# Patient Record
Sex: MALE | Race: WHITE | HISPANIC OR LATINO | Employment: UNEMPLOYED | URBAN - METROPOLITAN AREA
[De-identification: names, ages, dates, MRNs, and addresses within clinical notes are randomized per-mention and may not be internally consistent; named-entity substitution may affect disease eponyms.]

---

## 2021-12-17 ENCOUNTER — APPOINTMENT (EMERGENCY)
Dept: RADIOLOGY | Facility: HOSPITAL | Age: 36
DRG: 383 | End: 2021-12-17
Payer: COMMERCIAL

## 2021-12-17 ENCOUNTER — HOSPITAL ENCOUNTER (EMERGENCY)
Facility: HOSPITAL | Age: 36
DRG: 383 | End: 2021-12-17
Attending: EMERGENCY MEDICINE | Admitting: EMERGENCY MEDICINE
Payer: COMMERCIAL

## 2021-12-17 ENCOUNTER — HOSPITAL ENCOUNTER (INPATIENT)
Facility: HOSPITAL | Age: 36
LOS: 2 days | Discharge: HOME | DRG: 383 | End: 2021-12-19
Attending: ORTHOPAEDIC SURGERY | Admitting: ORTHOPAEDIC SURGERY
Payer: COMMERCIAL

## 2021-12-17 VITALS
OXYGEN SATURATION: 97 % | DIASTOLIC BLOOD PRESSURE: 69 MMHG | TEMPERATURE: 99.5 F | WEIGHT: 265 LBS | SYSTOLIC BLOOD PRESSURE: 114 MMHG | HEART RATE: 100 BPM | HEIGHT: 68 IN | BODY MASS INDEX: 40.16 KG/M2 | RESPIRATION RATE: 16 BRPM

## 2021-12-17 DIAGNOSIS — M65.141 SUPPURATIVE TENOSYNOVITIS OF FLEXOR TENDON OF RIGHT HAND: Primary | ICD-10-CM

## 2021-12-17 DIAGNOSIS — L03.011 CELLULITIS OF MIDDLE FINGER, RIGHT: Primary | ICD-10-CM

## 2021-12-17 LAB
ANION GAP SERPL CALCULATED.3IONS-SCNC: 10 MMOL/L (ref 4–13)
BASOPHILS # BLD AUTO: 0.02 THOUSANDS/ΜL (ref 0–0.1)
BASOPHILS NFR BLD AUTO: 0 % (ref 0–1)
BUN SERPL-MCNC: 15 MG/DL (ref 5–25)
CALCIUM SERPL-MCNC: 9.2 MG/DL (ref 8.3–10.1)
CHLORIDE SERPL-SCNC: 103 MMOL/L (ref 100–108)
CO2 SERPL-SCNC: 26 MMOL/L (ref 21–32)
CREAT SERPL-MCNC: 1.28 MG/DL (ref 0.6–1.3)
EOSINOPHIL # BLD AUTO: 0.11 THOUSAND/ΜL (ref 0–0.61)
EOSINOPHIL NFR BLD AUTO: 1 % (ref 0–6)
ERYTHROCYTE [DISTWIDTH] IN BLOOD BY AUTOMATED COUNT: 11 % (ref 11.6–15.1)
GFR SERPL CREATININE-BSD FRML MDRD: 72 ML/MIN/1.73SQ M
GLUCOSE SERPL-MCNC: 112 MG/DL (ref 65–140)
HCT VFR BLD AUTO: 42 % (ref 36.5–49.3)
HGB BLD-MCNC: 14.4 G/DL (ref 12–17)
IMM GRANULOCYTES # BLD AUTO: 0.03 THOUSAND/UL (ref 0–0.2)
IMM GRANULOCYTES NFR BLD AUTO: 0 % (ref 0–2)
LACTATE SERPL-SCNC: 1.1 MMOL/L (ref 0.5–2)
LYMPHOCYTES # BLD AUTO: 2.56 THOUSANDS/ΜL (ref 0.6–4.47)
LYMPHOCYTES NFR BLD AUTO: 25 % (ref 14–44)
MCH RBC QN AUTO: 31.4 PG (ref 26.8–34.3)
MCHC RBC AUTO-ENTMCNC: 34.3 G/DL (ref 31.4–37.4)
MCV RBC AUTO: 92 FL (ref 82–98)
MONOCYTES # BLD AUTO: 1.06 THOUSAND/ΜL (ref 0.17–1.22)
MONOCYTES NFR BLD AUTO: 10 % (ref 4–12)
NEUTROPHILS # BLD AUTO: 6.69 THOUSANDS/ΜL (ref 1.85–7.62)
NEUTS SEG NFR BLD AUTO: 64 % (ref 43–75)
NRBC BLD AUTO-RTO: 0 /100 WBCS
PLATELET # BLD AUTO: 287 THOUSANDS/UL (ref 149–390)
PMV BLD AUTO: 9.9 FL (ref 8.9–12.7)
POTASSIUM SERPL-SCNC: 3.8 MMOL/L (ref 3.5–5.3)
RBC # BLD AUTO: 4.59 MILLION/UL (ref 3.88–5.62)
SODIUM SERPL-SCNC: 139 MMOL/L (ref 136–145)
WBC # BLD AUTO: 10.47 THOUSAND/UL (ref 4.31–10.16)

## 2021-12-17 PROCEDURE — 96375 TX/PRO/DX INJ NEW DRUG ADDON: CPT

## 2021-12-17 PROCEDURE — 36415 COLL VENOUS BLD VENIPUNCTURE: CPT | Performed by: EMERGENCY MEDICINE

## 2021-12-17 PROCEDURE — 99285 EMERGENCY DEPT VISIT HI MDM: CPT

## 2021-12-17 PROCEDURE — 96365 THER/PROPH/DIAG IV INF INIT: CPT

## 2021-12-17 PROCEDURE — 87040 BLOOD CULTURE FOR BACTERIA: CPT | Performed by: EMERGENCY MEDICINE

## 2021-12-17 PROCEDURE — 93005 ELECTROCARDIOGRAM TRACING: CPT

## 2021-12-17 PROCEDURE — 85025 COMPLETE CBC W/AUTO DIFF WBC: CPT | Performed by: EMERGENCY MEDICINE

## 2021-12-17 PROCEDURE — 99285 EMERGENCY DEPT VISIT HI MDM: CPT | Performed by: EMERGENCY MEDICINE

## 2021-12-17 PROCEDURE — 80048 BASIC METABOLIC PNL TOTAL CA: CPT | Performed by: EMERGENCY MEDICINE

## 2021-12-17 PROCEDURE — 83605 ASSAY OF LACTIC ACID: CPT | Performed by: EMERGENCY MEDICINE

## 2021-12-17 PROCEDURE — 99284 EMERGENCY DEPT VISIT MOD MDM: CPT

## 2021-12-17 PROCEDURE — 73140 X-RAY EXAM OF FINGER(S): CPT

## 2021-12-17 PROCEDURE — NC001 PR NO CHARGE: Performed by: ORTHOPAEDIC SURGERY

## 2021-12-17 RX ORDER — NICOTINE 21 MG/24HR
1 PATCH, TRANSDERMAL 24 HOURS TRANSDERMAL DAILY
Status: DISCONTINUED | OUTPATIENT
Start: 2021-12-18 | End: 2021-12-19 | Stop reason: HOSPADM

## 2021-12-17 RX ORDER — MORPHINE SULFATE 4 MG/ML
4 INJECTION, SOLUTION INTRAMUSCULAR; INTRAVENOUS ONCE
Status: COMPLETED | OUTPATIENT
Start: 2021-12-17 | End: 2021-12-17

## 2021-12-17 RX ORDER — OXYCODONE HYDROCHLORIDE 10 MG/1
10 TABLET ORAL EVERY 4 HOURS PRN
Status: DISCONTINUED | OUTPATIENT
Start: 2021-12-17 | End: 2021-12-19 | Stop reason: HOSPADM

## 2021-12-17 RX ORDER — ACETAMINOPHEN 325 MG/1
975 TABLET ORAL EVERY 8 HOURS SCHEDULED
Status: DISCONTINUED | OUTPATIENT
Start: 2021-12-17 | End: 2021-12-19 | Stop reason: HOSPADM

## 2021-12-17 RX ORDER — HYDROMORPHONE HCL/PF 1 MG/ML
0.5 SYRINGE (ML) INJECTION
Status: DISCONTINUED | OUTPATIENT
Start: 2021-12-17 | End: 2021-12-19

## 2021-12-17 RX ORDER — DOCUSATE SODIUM 100 MG/1
100 CAPSULE, LIQUID FILLED ORAL 2 TIMES DAILY
Status: DISCONTINUED | OUTPATIENT
Start: 2021-12-17 | End: 2021-12-19 | Stop reason: HOSPADM

## 2021-12-17 RX ORDER — SENNOSIDES 8.6 MG
1 TABLET ORAL DAILY
Status: DISCONTINUED | OUTPATIENT
Start: 2021-12-18 | End: 2021-12-19 | Stop reason: HOSPADM

## 2021-12-17 RX ORDER — ONDANSETRON 2 MG/ML
4 INJECTION INTRAMUSCULAR; INTRAVENOUS EVERY 6 HOURS PRN
Status: DISCONTINUED | OUTPATIENT
Start: 2021-12-17 | End: 2021-12-19 | Stop reason: HOSPADM

## 2021-12-17 RX ORDER — ONDANSETRON 2 MG/ML
4 INJECTION INTRAMUSCULAR; INTRAVENOUS ONCE
Status: COMPLETED | OUTPATIENT
Start: 2021-12-17 | End: 2021-12-17

## 2021-12-17 RX ORDER — IBUPROFEN 600 MG/1
600 TABLET ORAL EVERY 6 HOURS SCHEDULED
Status: DISCONTINUED | OUTPATIENT
Start: 2021-12-17 | End: 2021-12-18

## 2021-12-17 RX ORDER — OXYCODONE HYDROCHLORIDE 5 MG/1
5 TABLET ORAL EVERY 4 HOURS PRN
Status: DISCONTINUED | OUTPATIENT
Start: 2021-12-17 | End: 2021-12-19 | Stop reason: HOSPADM

## 2021-12-17 RX ADMIN — ACETAMINOPHEN 975 MG: 325 TABLET, FILM COATED ORAL at 22:03

## 2021-12-17 RX ADMIN — OXYCODONE HYDROCHLORIDE 10 MG: 10 TABLET ORAL at 22:03

## 2021-12-17 RX ADMIN — IBUPROFEN 600 MG: 600 TABLET, FILM COATED ORAL at 22:51

## 2021-12-17 RX ADMIN — ONDANSETRON 4 MG: 2 INJECTION INTRAMUSCULAR; INTRAVENOUS at 22:04

## 2021-12-17 RX ADMIN — SODIUM CHLORIDE 3 G: 9 INJECTION, SOLUTION INTRAVENOUS at 16:16

## 2021-12-17 RX ADMIN — DOCUSATE SODIUM 100 MG: 100 CAPSULE ORAL at 22:03

## 2021-12-17 RX ADMIN — MORPHINE SULFATE 4 MG: 4 INJECTION INTRAVENOUS at 16:40

## 2021-12-17 RX ADMIN — ONDANSETRON 4 MG: 2 INJECTION INTRAMUSCULAR; INTRAVENOUS at 16:40

## 2021-12-17 RX ADMIN — VANCOMYCIN HYDROCHLORIDE 1750 MG: 1 INJECTION, POWDER, LYOPHILIZED, FOR SOLUTION INTRAVENOUS at 19:00

## 2021-12-17 RX ADMIN — PIPERACILLIN AND TAZOBACTAM 3.38 G: 36; 4.5 INJECTION, POWDER, FOR SOLUTION INTRAVENOUS at 23:30

## 2021-12-17 RX ADMIN — HYDROMORPHONE HYDROCHLORIDE 0.5 MG: 1 INJECTION, SOLUTION INTRAMUSCULAR; INTRAVENOUS; SUBCUTANEOUS at 22:03

## 2021-12-17 RX ADMIN — HYDROMORPHONE HYDROCHLORIDE 0.5 MG: 1 INJECTION, SOLUTION INTRAMUSCULAR; INTRAVENOUS; SUBCUTANEOUS at 22:49

## 2021-12-18 PROBLEM — M65.9 FLEXOR TENOSYNOVITIS OF FINGER: Status: ACTIVE | Noted: 2021-12-18

## 2021-12-18 LAB
ANION GAP SERPL CALCULATED.3IONS-SCNC: 6 MMOL/L (ref 4–13)
ATRIAL RATE: 74 BPM
ATRIAL RATE: 83 BPM
ATRIAL RATE: 89 BPM
BASOPHILS # BLD AUTO: 0.05 THOUSANDS/ΜL (ref 0–0.1)
BASOPHILS NFR BLD AUTO: 1 % (ref 0–1)
BUN SERPL-MCNC: 18 MG/DL (ref 5–25)
CALCIUM SERPL-MCNC: 9 MG/DL (ref 8.3–10.1)
CHLORIDE SERPL-SCNC: 105 MMOL/L (ref 100–108)
CO2 SERPL-SCNC: 26 MMOL/L (ref 21–32)
CREAT SERPL-MCNC: 1.11 MG/DL (ref 0.6–1.3)
CRP SERPL QL: 29 MG/L
EOSINOPHIL # BLD AUTO: 0.1 THOUSAND/ΜL (ref 0–0.61)
EOSINOPHIL NFR BLD AUTO: 1 % (ref 0–6)
ERYTHROCYTE [DISTWIDTH] IN BLOOD BY AUTOMATED COUNT: 11.3 % (ref 11.6–15.1)
ERYTHROCYTE [SEDIMENTATION RATE] IN BLOOD: 21 MM/HOUR (ref 0–14)
GFR SERPL CREATININE-BSD FRML MDRD: 85 ML/MIN/1.73SQ M
GLUCOSE SERPL-MCNC: 117 MG/DL (ref 65–140)
HCT VFR BLD AUTO: 38.6 % (ref 36.5–49.3)
HGB BLD-MCNC: 13.1 G/DL (ref 12–17)
IMM GRANULOCYTES # BLD AUTO: 0.01 THOUSAND/UL (ref 0–0.2)
IMM GRANULOCYTES NFR BLD AUTO: 0 % (ref 0–2)
LYMPHOCYTES # BLD AUTO: 2.47 THOUSANDS/ΜL (ref 0.6–4.47)
LYMPHOCYTES NFR BLD AUTO: 32 % (ref 14–44)
MCH RBC QN AUTO: 31 PG (ref 26.8–34.3)
MCHC RBC AUTO-ENTMCNC: 33.9 G/DL (ref 31.4–37.4)
MCV RBC AUTO: 92 FL (ref 82–98)
MONOCYTES # BLD AUTO: 0.83 THOUSAND/ΜL (ref 0.17–1.22)
MONOCYTES NFR BLD AUTO: 11 % (ref 4–12)
NEUTROPHILS # BLD AUTO: 4.31 THOUSANDS/ΜL (ref 1.85–7.62)
NEUTS SEG NFR BLD AUTO: 55 % (ref 43–75)
NRBC BLD AUTO-RTO: 0 /100 WBCS
P AXIS: 49 DEGREES
P AXIS: 54 DEGREES
P AXIS: 59 DEGREES
PLATELET # BLD AUTO: 256 THOUSANDS/UL (ref 149–390)
PMV BLD AUTO: 9.7 FL (ref 8.9–12.7)
POTASSIUM SERPL-SCNC: 3.6 MMOL/L (ref 3.5–5.3)
PR INTERVAL: 164 MS
PR INTERVAL: 174 MS
PR INTERVAL: 174 MS
QRS AXIS: 12 DEGREES
QRS AXIS: 30 DEGREES
QRS AXIS: 9 DEGREES
QRSD INTERVAL: 102 MS
QRSD INTERVAL: 104 MS
QRSD INTERVAL: 90 MS
QT INTERVAL: 350 MS
QT INTERVAL: 386 MS
QT INTERVAL: 388 MS
QTC INTERVAL: 425 MS
QTC INTERVAL: 428 MS
QTC INTERVAL: 455 MS
RBC # BLD AUTO: 4.22 MILLION/UL (ref 3.88–5.62)
SODIUM SERPL-SCNC: 137 MMOL/L (ref 136–145)
T WAVE AXIS: 0 DEGREES
T WAVE AXIS: 0 DEGREES
T WAVE AXIS: 23 DEGREES
VENTRICULAR RATE: 74 BPM
VENTRICULAR RATE: 83 BPM
VENTRICULAR RATE: 89 BPM
WBC # BLD AUTO: 7.77 THOUSAND/UL (ref 4.31–10.16)

## 2021-12-18 PROCEDURE — 86140 C-REACTIVE PROTEIN: CPT | Performed by: ORTHOPAEDIC SURGERY

## 2021-12-18 PROCEDURE — 0H9FXZZ DRAINAGE OF RIGHT HAND SKIN, EXTERNAL APPROACH: ICD-10-PCS | Performed by: ORTHOPAEDIC SURGERY

## 2021-12-18 PROCEDURE — 80048 BASIC METABOLIC PNL TOTAL CA: CPT | Performed by: ORTHOPAEDIC SURGERY

## 2021-12-18 PROCEDURE — 93010 ELECTROCARDIOGRAM REPORT: CPT | Performed by: INTERNAL MEDICINE

## 2021-12-18 PROCEDURE — 85652 RBC SED RATE AUTOMATED: CPT | Performed by: ORTHOPAEDIC SURGERY

## 2021-12-18 PROCEDURE — 85025 COMPLETE CBC W/AUTO DIFF WBC: CPT | Performed by: ORTHOPAEDIC SURGERY

## 2021-12-18 PROCEDURE — NC001 PR NO CHARGE: Performed by: ORTHOPAEDIC SURGERY

## 2021-12-18 RX ORDER — KETOROLAC TROMETHAMINE 30 MG/ML
30 INJECTION, SOLUTION INTRAMUSCULAR; INTRAVENOUS EVERY 6 HOURS SCHEDULED
Status: DISCONTINUED | OUTPATIENT
Start: 2021-12-18 | End: 2021-12-19

## 2021-12-18 RX ORDER — HYDROXYZINE HYDROCHLORIDE 25 MG/1
25 TABLET, FILM COATED ORAL EVERY 6 HOURS PRN
Status: DISCONTINUED | OUTPATIENT
Start: 2021-12-18 | End: 2021-12-19 | Stop reason: HOSPADM

## 2021-12-18 RX ORDER — METOCLOPRAMIDE HYDROCHLORIDE 5 MG/ML
10 INJECTION INTRAMUSCULAR; INTRAVENOUS EVERY 6 HOURS PRN
Status: DISCONTINUED | OUTPATIENT
Start: 2021-12-18 | End: 2021-12-19 | Stop reason: HOSPADM

## 2021-12-18 RX ORDER — PROMETHAZINE HYDROCHLORIDE 25 MG/ML
25 INJECTION, SOLUTION INTRAMUSCULAR; INTRAVENOUS EVERY 6 HOURS PRN
Status: DISCONTINUED | OUTPATIENT
Start: 2021-12-18 | End: 2021-12-18

## 2021-12-18 RX ADMIN — ACETAMINOPHEN 975 MG: 325 TABLET, FILM COATED ORAL at 21:39

## 2021-12-18 RX ADMIN — HYDROXYZINE HYDROCHLORIDE 25 MG: 25 TABLET ORAL at 04:56

## 2021-12-18 RX ADMIN — ONDANSETRON 4 MG: 2 INJECTION INTRAMUSCULAR; INTRAVENOUS at 01:02

## 2021-12-18 RX ADMIN — KETOROLAC TROMETHAMINE 30 MG: 30 INJECTION, SOLUTION INTRAMUSCULAR; INTRAVENOUS at 20:14

## 2021-12-18 RX ADMIN — DOCUSATE SODIUM 100 MG: 100 CAPSULE ORAL at 09:28

## 2021-12-18 RX ADMIN — ACETAMINOPHEN 975 MG: 325 TABLET, FILM COATED ORAL at 06:30

## 2021-12-18 RX ADMIN — HYDROMORPHONE HYDROCHLORIDE 0.5 MG: 1 INJECTION, SOLUTION INTRAMUSCULAR; INTRAVENOUS; SUBCUTANEOUS at 18:20

## 2021-12-18 RX ADMIN — DOCUSATE SODIUM 100 MG: 100 CAPSULE ORAL at 17:00

## 2021-12-18 RX ADMIN — ONDANSETRON 4 MG: 2 INJECTION INTRAMUSCULAR; INTRAVENOUS at 22:42

## 2021-12-18 RX ADMIN — ACETAMINOPHEN 975 MG: 325 TABLET, FILM COATED ORAL at 13:39

## 2021-12-18 RX ADMIN — METOCLOPRAMIDE HYDROCHLORIDE 10 MG: 5 INJECTION INTRAMUSCULAR; INTRAVENOUS at 04:13

## 2021-12-18 RX ADMIN — HYDROMORPHONE HYDROCHLORIDE 0.5 MG: 1 INJECTION, SOLUTION INTRAMUSCULAR; INTRAVENOUS; SUBCUTANEOUS at 13:39

## 2021-12-18 RX ADMIN — OXYCODONE HYDROCHLORIDE 10 MG: 10 TABLET ORAL at 06:30

## 2021-12-18 RX ADMIN — IBUPROFEN 600 MG: 600 TABLET, FILM COATED ORAL at 12:30

## 2021-12-18 RX ADMIN — ONDANSETRON 4 MG: 2 INJECTION INTRAMUSCULAR; INTRAVENOUS at 06:30

## 2021-12-18 RX ADMIN — HYDROMORPHONE HYDROCHLORIDE 0.5 MG: 1 INJECTION, SOLUTION INTRAMUSCULAR; INTRAVENOUS; SUBCUTANEOUS at 09:28

## 2021-12-18 RX ADMIN — OXYCODONE HYDROCHLORIDE 10 MG: 10 TABLET ORAL at 16:47

## 2021-12-18 RX ADMIN — IBUPROFEN 600 MG: 600 TABLET, FILM COATED ORAL at 06:32

## 2021-12-18 RX ADMIN — OXYCODONE HYDROCHLORIDE 10 MG: 10 TABLET ORAL at 12:31

## 2021-12-18 RX ADMIN — IBUPROFEN 600 MG: 600 TABLET, FILM COATED ORAL at 17:00

## 2021-12-18 RX ADMIN — HYDROMORPHONE HYDROCHLORIDE 0.5 MG: 1 INJECTION, SOLUTION INTRAMUSCULAR; INTRAVENOUS; SUBCUTANEOUS at 22:42

## 2021-12-18 RX ADMIN — OXYCODONE HYDROCHLORIDE 10 MG: 10 TABLET ORAL at 21:41

## 2021-12-18 RX ADMIN — STANDARDIZED SENNA CONCENTRATE 8.6 MG: 8.6 TABLET ORAL at 09:28

## 2021-12-18 RX ADMIN — HYDROMORPHONE HYDROCHLORIDE 0.5 MG: 1 INJECTION, SOLUTION INTRAMUSCULAR; INTRAVENOUS; SUBCUTANEOUS at 00:12

## 2021-12-19 VITALS
DIASTOLIC BLOOD PRESSURE: 76 MMHG | OXYGEN SATURATION: 95 % | HEART RATE: 67 BPM | RESPIRATION RATE: 18 BRPM | BODY MASS INDEX: 40.16 KG/M2 | HEIGHT: 68 IN | WEIGHT: 265 LBS | SYSTOLIC BLOOD PRESSURE: 122 MMHG | TEMPERATURE: 97.9 F

## 2021-12-19 PROBLEM — M54.50 CHRONIC BILATERAL LOW BACK PAIN WITHOUT SCIATICA: Status: ACTIVE | Noted: 2018-12-10

## 2021-12-19 PROBLEM — K21.9 GASTROESOPHAGEAL REFLUX DISEASE: Status: ACTIVE | Noted: 2019-04-30

## 2021-12-19 PROBLEM — F32.A DEPRESSIVE DISORDER: Status: ACTIVE | Noted: 2018-12-10

## 2021-12-19 PROBLEM — F12.20 CANNABIS USE DISORDER, SEVERE, DEPENDENCE (HCC): Status: ACTIVE | Noted: 2020-09-29

## 2021-12-19 PROBLEM — G89.29 CHRONIC BILATERAL LOW BACK PAIN WITHOUT SCIATICA: Status: ACTIVE | Noted: 2018-12-10

## 2021-12-19 PROBLEM — S22.49XA MULTIPLE RIB FRACTURES: Status: ACTIVE | Noted: 2017-05-01

## 2021-12-19 PROBLEM — N18.1 CKD (CHRONIC KIDNEY DISEASE) STAGE 1, GFR 90 ML/MIN OR GREATER: Status: ACTIVE | Noted: 2017-05-01

## 2021-12-19 PROBLEM — L03.011 CELLULITIS OF MIDDLE FINGER, RIGHT: Status: ACTIVE | Noted: 2021-12-18

## 2021-12-19 PROBLEM — Z80.0 FAMILY HISTORY OF COLON CANCER: Status: ACTIVE | Noted: 2019-04-30

## 2021-12-19 PROBLEM — F41.9 ANXIETY: Status: ACTIVE | Noted: 2020-09-29

## 2021-12-19 PROCEDURE — NC001 PR NO CHARGE: Performed by: ORTHOPAEDIC SURGERY

## 2021-12-19 PROCEDURE — 99238 HOSP IP/OBS DSCHRG MGMT 30/<: CPT | Performed by: PHYSICIAN ASSISTANT

## 2021-12-19 RX ORDER — OXYCODONE HYDROCHLORIDE 5 MG/1
TABLET ORAL
Qty: 8 TABLET | Refills: 0 | Status: SHIPPED | OUTPATIENT
Start: 2021-12-19 | End: 2021-12-22 | Stop reason: ALTCHOICE

## 2021-12-19 RX ORDER — IBUPROFEN 400 MG/1
400 TABLET ORAL EVERY 8 HOURS SCHEDULED
Status: DISCONTINUED | OUTPATIENT
Start: 2021-12-19 | End: 2021-12-19 | Stop reason: HOSPADM

## 2021-12-19 RX ORDER — AMOXICILLIN AND CLAVULANATE POTASSIUM 875; 125 MG/1; MG/1
1 TABLET, FILM COATED ORAL EVERY 12 HOURS SCHEDULED
Qty: 20 TABLET | Refills: 0 | Status: SHIPPED | OUTPATIENT
Start: 2021-12-19 | End: 2021-12-29

## 2021-12-19 RX ORDER — SENNOSIDES 8.6 MG
650 CAPSULE ORAL EVERY 8 HOURS PRN
Qty: 30 TABLET | Refills: 0 | Status: SHIPPED | OUTPATIENT
Start: 2021-12-19 | End: 2021-12-22 | Stop reason: ALTCHOICE

## 2021-12-19 RX ADMIN — KETOROLAC TROMETHAMINE 30 MG: 30 INJECTION, SOLUTION INTRAMUSCULAR; INTRAVENOUS at 08:02

## 2021-12-19 RX ADMIN — KETOROLAC TROMETHAMINE 30 MG: 30 INJECTION, SOLUTION INTRAMUSCULAR; INTRAVENOUS at 01:58

## 2021-12-19 RX ADMIN — OXYCODONE HYDROCHLORIDE 10 MG: 10 TABLET ORAL at 01:57

## 2021-12-19 RX ADMIN — IBUPROFEN 400 MG: 400 TABLET, FILM COATED ORAL at 10:00

## 2021-12-19 RX ADMIN — ACETAMINOPHEN 975 MG: 325 TABLET, FILM COATED ORAL at 14:58

## 2021-12-19 RX ADMIN — DOCUSATE SODIUM 100 MG: 100 CAPSULE ORAL at 10:00

## 2021-12-19 RX ADMIN — METOCLOPRAMIDE HYDROCHLORIDE 10 MG: 5 INJECTION INTRAMUSCULAR; INTRAVENOUS at 01:58

## 2021-12-19 RX ADMIN — ACETAMINOPHEN 975 MG: 325 TABLET, FILM COATED ORAL at 06:31

## 2021-12-19 RX ADMIN — IBUPROFEN 400 MG: 400 TABLET, FILM COATED ORAL at 14:58

## 2021-12-19 RX ADMIN — OXYCODONE HYDROCHLORIDE 10 MG: 10 TABLET ORAL at 06:31

## 2021-12-19 RX ADMIN — STANDARDIZED SENNA CONCENTRATE 8.6 MG: 8.6 TABLET ORAL at 10:00

## 2021-12-20 ENCOUNTER — TELEPHONE (OUTPATIENT)
Dept: OBGYN CLINIC | Facility: HOSPITAL | Age: 36
End: 2021-12-20

## 2021-12-22 ENCOUNTER — OFFICE VISIT (OUTPATIENT)
Dept: OBGYN CLINIC | Facility: HOSPITAL | Age: 36
End: 2021-12-22
Payer: COMMERCIAL

## 2021-12-22 VITALS
WEIGHT: 263.4 LBS | HEART RATE: 80 BPM | HEIGHT: 69 IN | DIASTOLIC BLOOD PRESSURE: 72 MMHG | BODY MASS INDEX: 39.01 KG/M2 | SYSTOLIC BLOOD PRESSURE: 107 MMHG

## 2021-12-22 DIAGNOSIS — L03.011 CELLULITIS OF MIDDLE FINGER, RIGHT: Primary | ICD-10-CM

## 2021-12-22 LAB
BACTERIA BLD CULT: NORMAL
BACTERIA BLD CULT: NORMAL

## 2021-12-22 PROCEDURE — 99213 OFFICE O/P EST LOW 20 MIN: CPT | Performed by: ORTHOPAEDIC SURGERY

## 2023-01-12 ENCOUNTER — APPOINTMENT (EMERGENCY)
Dept: RADIOLOGY | Facility: HOSPITAL | Age: 38
End: 2023-01-12

## 2023-01-12 ENCOUNTER — HOSPITAL ENCOUNTER (EMERGENCY)
Facility: HOSPITAL | Age: 38
Discharge: HOME | End: 2023-01-12
Attending: EMERGENCY MEDICINE

## 2023-01-12 VITALS
SYSTOLIC BLOOD PRESSURE: 109 MMHG | DIASTOLIC BLOOD PRESSURE: 81 MMHG | OXYGEN SATURATION: 97 % | TEMPERATURE: 98.5 F | RESPIRATION RATE: 20 BRPM | HEART RATE: 96 BPM

## 2023-01-12 DIAGNOSIS — R07.89 ATYPICAL CHEST PAIN: ICD-10-CM

## 2023-01-12 DIAGNOSIS — F10.929 ALCOHOL INTOXICATION (HCC): ICD-10-CM

## 2023-01-12 DIAGNOSIS — F41.9 ANXIETY: Primary | ICD-10-CM

## 2023-01-12 DIAGNOSIS — R11.2 NAUSEA & VOMITING: ICD-10-CM

## 2023-01-12 LAB
ALBUMIN SERPL BCP-MCNC: 3.5 G/DL (ref 3.5–5)
ALP SERPL-CCNC: 91 U/L (ref 46–116)
ALT SERPL W P-5'-P-CCNC: 49 U/L (ref 12–78)
ANION GAP SERPL CALCULATED.3IONS-SCNC: 7 MMOL/L (ref 4–13)
AST SERPL W P-5'-P-CCNC: 24 U/L (ref 5–45)
ATRIAL RATE: 97 BPM
BASOPHILS # BLD AUTO: 0.05 THOUSANDS/ÂΜL (ref 0–0.1)
BASOPHILS NFR BLD AUTO: 1 % (ref 0–1)
BILIRUB SERPL-MCNC: 0.18 MG/DL (ref 0.2–1)
BUN SERPL-MCNC: 10 MG/DL (ref 5–25)
CALCIUM SERPL-MCNC: 8.5 MG/DL (ref 8.3–10.1)
CARDIAC TROPONIN I PNL SERPL HS: 2 NG/L
CHLORIDE SERPL-SCNC: 105 MMOL/L (ref 96–108)
CO2 SERPL-SCNC: 26 MMOL/L (ref 21–32)
CREAT SERPL-MCNC: 1.07 MG/DL (ref 0.6–1.3)
EOSINOPHIL # BLD AUTO: 0.09 THOUSAND/ÂΜL (ref 0–0.61)
EOSINOPHIL NFR BLD AUTO: 1 % (ref 0–6)
ERYTHROCYTE [DISTWIDTH] IN BLOOD BY AUTOMATED COUNT: 11.2 % (ref 11.6–15.1)
GFR SERPL CREATININE-BSD FRML MDRD: 88 ML/MIN/1.73SQ M
GLUCOSE SERPL-MCNC: 130 MG/DL (ref 65–140)
HCT VFR BLD AUTO: 42.5 % (ref 36.5–49.3)
HGB BLD-MCNC: 14.7 G/DL (ref 12–17)
IMM GRANULOCYTES # BLD AUTO: 0.05 THOUSAND/UL (ref 0–0.2)
IMM GRANULOCYTES NFR BLD AUTO: 1 % (ref 0–2)
LIPASE SERPL-CCNC: 182 U/L (ref 73–393)
LYMPHOCYTES # BLD AUTO: 3.8 THOUSANDS/ÂΜL (ref 0.6–4.47)
LYMPHOCYTES NFR BLD AUTO: 39 % (ref 14–44)
MCH RBC QN AUTO: 30.6 PG (ref 26.8–34.3)
MCHC RBC AUTO-ENTMCNC: 34.6 G/DL (ref 31.4–37.4)
MCV RBC AUTO: 88 FL (ref 82–98)
MONOCYTES # BLD AUTO: 0.64 THOUSAND/ÂΜL (ref 0.17–1.22)
MONOCYTES NFR BLD AUTO: 7 % (ref 4–12)
NEUTROPHILS # BLD AUTO: 5.2 THOUSANDS/ÂΜL (ref 1.85–7.62)
NEUTS SEG NFR BLD AUTO: 51 % (ref 43–75)
NRBC BLD AUTO-RTO: 0 /100 WBCS
P AXIS: 35 DEGREES
PLATELET # BLD AUTO: 327 THOUSANDS/UL (ref 149–390)
PMV BLD AUTO: 9.3 FL (ref 8.9–12.7)
POTASSIUM SERPL-SCNC: 3.7 MMOL/L (ref 3.5–5.3)
PR INTERVAL: 216 MS
PROT SERPL-MCNC: 7.3 G/DL (ref 6.4–8.4)
QRS AXIS: 16 DEGREES
QRSD INTERVAL: 100 MS
QT INTERVAL: 338 MS
QTC INTERVAL: 429 MS
RBC # BLD AUTO: 4.81 MILLION/UL (ref 3.88–5.62)
SODIUM SERPL-SCNC: 138 MMOL/L (ref 135–147)
T WAVE AXIS: 16 DEGREES
VENTRICULAR RATE: 97 BPM
WBC # BLD AUTO: 9.83 THOUSAND/UL (ref 4.31–10.16)

## 2023-01-12 RX ORDER — LORAZEPAM 2 MG/ML
1 INJECTION INTRAMUSCULAR ONCE
Status: COMPLETED | OUTPATIENT
Start: 2023-01-12 | End: 2023-01-12

## 2023-01-12 RX ORDER — HALOPERIDOL 5 MG/ML
5 INJECTION INTRAMUSCULAR ONCE
Status: COMPLETED | OUTPATIENT
Start: 2023-01-12 | End: 2023-01-12

## 2023-01-12 RX ORDER — ACETAMINOPHEN 325 MG/1
650 TABLET ORAL ONCE
Status: COMPLETED | OUTPATIENT
Start: 2023-01-12 | End: 2023-01-12

## 2023-01-12 RX ORDER — ONDANSETRON 2 MG/ML
4 INJECTION INTRAMUSCULAR; INTRAVENOUS ONCE
Status: DISCONTINUED | OUTPATIENT
Start: 2023-01-12 | End: 2023-01-12

## 2023-01-12 RX ADMIN — LORAZEPAM 1 MG: 2 INJECTION INTRAMUSCULAR; INTRAVENOUS at 01:15

## 2023-01-12 RX ADMIN — HALOPERIDOL LACTATE 5 MG: 5 INJECTION, SOLUTION INTRAMUSCULAR at 01:15

## 2023-01-12 RX ADMIN — ACETAMINOPHEN 650 MG: 325 TABLET ORAL at 01:13

## 2023-01-12 RX ADMIN — SODIUM CHLORIDE 1000 ML: 0.9 INJECTION, SOLUTION INTRAVENOUS at 01:10

## 2023-01-12 NOTE — ED PROVIDER NOTES
History  Chief Complaint   Patient presents with   • Anxiety     Patient states couldn't control breathing and felt like he was going to pass out  States has been under a lot of stress lately, not being able to work and marital stress  States has not taken meds  64-year-old male with a past medical history of asthma and anxiety presents feeling anxious  Patient is crying and keeps repeating that he cannot remember what happened today  Patient donated plasma earlier today and then proceeded to drink 4 beers and 4-5 shots of liquor  Patient states that he cannot remember much of what happened after that  He notes that his wife called EMS because he was having trouble breathing at home  Patient states that he has been very anxious lately and is feeling very anxious at this time  He will not disclose what may have triggered his current feelings of anxiety  Patient states that he felt like he had chest pain when he was at home with his wife, but does not anymore  He was having trouble breathing, but he is feeling much calmer now  He does note generalized abdominal pain and a few episodes of vomiting  Patient states that he was not experiencing any of the symptoms yesterday  Patient states that he has not had any recent leg swelling, recent surgeries, or other immobilizations  Patient states that he used to take a medication for anxiety, but stopped taking it because it stopped working  None       Past Medical History:   Diagnosis Date   • Asthma        Past Surgical History:   Procedure Laterality Date   • NO PAST SURGERIES         History reviewed  No pertinent family history  I have reviewed and agree with the history as documented  E-Cigarette/Vaping     E-Cigarette/Vaping Substances     Social History     Tobacco Use   • Smoking status: Some Days     Packs/day: 0 50     Types: Cigarettes   • Smokeless tobacco: Never   Substance Use Topics   • Alcohol use:  Yes   • Drug use: Yes     Types: Marijuana        Review of Systems   Constitutional: Negative for chills, fatigue and fever  HENT: Negative  Eyes: Negative  Respiratory: Positive for shortness of breath  Negative for cough and chest tightness  Cardiovascular: Positive for chest pain  Negative for palpitations  Gastrointestinal: Positive for abdominal pain, nausea and vomiting  Negative for diarrhea  Endocrine: Negative  Genitourinary: Negative for difficulty urinating and hematuria  Musculoskeletal: Negative for arthralgias and myalgias  Skin: Negative for pallor and rash  Allergic/Immunologic: Negative  Neurological: Positive for light-headedness  Negative for dizziness, weakness and headaches  Hematological: Negative  Psychiatric/Behavioral: Negative for suicidal ideas  The patient is nervous/anxious  Physical Exam  ED Triage Vitals   Temperature Pulse Respirations Blood Pressure SpO2   01/12/23 0118 01/12/23 0034 01/12/23 0034 01/12/23 0034 01/12/23 0034   98 5 °F (36 9 °C) 96 (!) 28 109/81 97 %      Temp Source Heart Rate Source Patient Position - Orthostatic VS BP Location FiO2 (%)   01/12/23 0118 01/12/23 0034 01/12/23 0034 01/12/23 0034 --   Oral Monitor Lying Right arm       Pain Score       --                    Orthostatic Vital Signs  Vitals:    01/12/23 0034   BP: 109/81   Pulse: 96   Patient Position - Orthostatic VS: Lying       Physical Exam  Vitals and nursing note reviewed  Constitutional:       General: He is not in acute distress  Appearance: Normal appearance  He is not ill-appearing  Comments: Intoxicated  Crying  HENT:      Head: Normocephalic and atraumatic  Nose: Nose normal    Eyes:      Conjunctiva/sclera: Conjunctivae normal    Cardiovascular:      Rate and Rhythm: Normal rate and regular rhythm  Heart sounds: No murmur heard  Pulmonary:      Effort: Pulmonary effort is normal  No respiratory distress  Breath sounds: Normal breath sounds   No wheezing, rhonchi or rales  Abdominal:      General: Abdomen is flat  There is no distension  Palpations: Abdomen is soft  Tenderness: There is generalized abdominal tenderness  There is no guarding or rebound  Musculoskeletal:         General: Normal range of motion  Cervical back: Normal range of motion and neck supple  Right lower leg: No edema  Left lower leg: No edema  Skin:     General: Skin is warm and dry  Neurological:      General: No focal deficit present  Mental Status: He is alert and oriented to person, place, and time           ED Medications  Medications   sodium chloride 0 9 % bolus 1,000 mL (0 mL Intravenous Stopped 1/12/23 0259)   acetaminophen (TYLENOL) tablet 650 mg (650 mg Oral Given 1/12/23 0113)   LORazepam (ATIVAN) injection 1 mg (1 mg Intravenous Given 1/12/23 0115)   haloperidol lactate (HALDOL) injection 5 mg (5 mg Intravenous Given 1/12/23 0115)       Diagnostic Studies  Results Reviewed     Procedure Component Value Units Date/Time    Comprehensive metabolic panel [[id number]]  (Abnormal) Collected: 01/12/23 0108    Lab Status: Final result Specimen: Blood from Arm, Right Updated: 01/12/23 0151     Sodium 138 mmol/L      Potassium 3 7 mmol/L      Chloride 105 mmol/L      CO2 26 mmol/L      ANION GAP 7 mmol/L      BUN 10 mg/dL      Creatinine 1 07 mg/dL      Glucose 130 mg/dL      Calcium 8 5 mg/dL      AST 24 U/L      ALT 49 U/L      Alkaline Phosphatase 91 U/L      Total Protein 7 3 g/dL      Albumin 3 5 g/dL      Total Bilirubin 0 18 mg/dL      eGFR 88 ml/min/1 73sq m     Narrative:      [place] guidelines for Chronic Kidney Disease (CKD):   •  Stage 1 with normal or high GFR (GFR > 90 mL/min/1 73 square meters)  •  Stage 2 Mild CKD (GFR = 60-89 mL/min/1 73 square meters)  •  Stage 3A Moderate CKD (GFR = 45-59 mL/min/1 73 square meters)  •  Stage 3B Moderate CKD (GFR = 30-44 mL/min/1 73 square meters)  •  Stage 4 Severe CKD (GFR = 15-29 mL/min/1 73 square meters)  •  Stage 5 End Stage CKD (GFR <15 mL/min/1 73 square meters)  Note: GFR calculation is accurate only with a steady state creatinine    Lipase [[id number]]  (Normal) Collected: 01/12/23 0108    Lab Status: Final result Specimen: Blood from Arm, Right Updated: 01/12/23 0151     Lipase 182 u/L     HS Troponin 0hr (reflex protocol) [[id number]]  (Normal) Collected: 01/12/23 0108    Lab Status: Final result Specimen: Blood from Arm, Right Updated: 01/12/23 0146     hs TnI 0hr 2 ng/L     CBC and differential [[id number]]  (Abnormal) Collected: 01/12/23 0108    Lab Status: Final result Specimen: Blood from Arm, Right Updated: 01/12/23 0127     WBC 9 83 Thousand/uL      RBC 4 81 Million/uL      Hemoglobin 14 7 g/dL      Hematocrit 42 5 %      MCV 88 fL      MCH 30 6 pg      MCHC 34 6 g/dL      RDW 11 2 %      MPV 9 3 fL      Platelets 835 Thousands/uL      nRBC 0 /100 WBCs      Neutrophils Relative 51 %      Immat GRANS % 1 %      Lymphocytes Relative 39 %      Monocytes Relative 7 %      Eosinophils Relative 1 %      Basophils Relative 1 %      Neutrophils Absolute 5 20 Thousands/µL      Immature Grans Absolute 0 05 Thousand/uL      Lymphocytes Absolute 3 80 Thousands/µL      Monocytes Absolute 0 64 Thousand/µL      Eosinophils Absolute 0 09 Thousand/µL      Basophils Absolute 0 05 Thousands/µL                  XR chest 1 view portable   ED Interpretation by Enoc Yoder DO (01/12 0139)   No acute cardiopulmonary process            Procedures  Procedures      ED Course  ED Course as of 01/12/23 0507   Thu Jan 12, 2023   0141 The heart rate is 97, which is normal  The rhythm is regular  The axis is normal  The P waves are normal and the CA interval is normal  The QRS height is normal and width is normal  The ST segments are not elevated or depressed   The T waves are normal  The QT segment is normal  This ecg shows sinus rhythm       0222 Pt now comfortable and sleeping             HEART Risk Score    Flowsheet Row Most Recent Value   Heart Score Risk Calculator    History 0 Filed at: 01/12/2023 0349   ECG 0 Filed at: 01/12/2023 0349   Age 0 Filed at: 01/12/2023 0349   Risk Factors 0 Filed at: 01/12/2023 0349   Troponin 0 Filed at: 01/12/2023 0349   HEART Score 0 Filed at: 01/12/2023 9065                                Medical Decision Making  44-year-old male who is intoxicated and presents with anxiety, chest pain, shortness of breath, and abdominal pain with vomiting  Patient symptoms could be attributed to his alcohol intoxication and history of anxiety  However need to rule out cardiac etiology with cardiac work-up  No need for D-dimer as patient is PERC negative  We will get a lipase to rule out pancreatitis  Will give IV fluids, Haldol, Tylenol, and Ativan and reevaluate  Exam is benign and patient does not need any kind of imaging other than a chest x-ray to rule out cardiopulmonary process  Patient felt better after medications  He is not having any abdominal pain or tenderness  No chest pain or shortness of breath  Symptoms are likely a combination of anxiety and excessive alcohol intake after donating plasma  Patient was advised to speak to his primary care physician about his anxiety  Discussed all results and plans with patient  Recommend follow up with primary care physician  Return precautions given  All questions answered         Alcohol intoxication ([facility] [address] ): acute illness or injury     Details: Improved after medications and with time  Anxiety: chronic illness or injury with exacerbation, progression, or side effects of treatment     Details: Worsening since stopping medication, advised patient to follow-up with his primary care physician about this  Atypical chest pain: acute illness or injury     Details: Resolved after medications  Nausea & vomiting: acute illness or injury     Details: Resolved after medications  Amount and/or Complexity of Data Reviewed  External Data Reviewed:      Details: Reviewed past medical history  Labs: ordered  Details: CBC, CMP, troponin, and lipase were ordered to evaluate for abdominal or cardiac pathology that would cause patient's symptoms  Radiology: ordered and independent interpretation performed  Details: Chest x-ray ordered rule out acute cardiopulmonary process  ECG/medicine tests: ordered and independent interpretation performed  Decision-making details documented in ED Course  Risk  Risk Details: Patient is at high risk for decompensation secondary to alcohol intoxication  He is also at high risk for decompensation secondary to failure to follow-up with his primary care physician about anxiety  Disposition  Final diagnoses:   Anxiety   Alcohol intoxication ([facility] [address] )   Atypical chest pain   Nausea & vomiting     Time reflects when diagnosis was documented in both MDM as applicable and the Disposition within this note     Time User Action Codes Description Comment    1/12/2023  2:23 AM Gaynelle Factor Add [F41 9] Anxiety     1/12/2023  2:23 AM Florentino Zunigaau Add [F10 929] Alcohol intoxication ([facility] [address] )     1/12/2023  2:23 AM Gaynelle Factor Add [R07 89] Atypical chest pain     1/12/2023  2:23 AM Gaynelle Factor Add [R11 2] Nausea & vomiting       ED Disposition     ED Disposition   Discharge    Condition   Good    Date/Time   Thu Jan 12, 2023  2:23 AM    Comment   Arelis Laboy discharge to home/self care  Follow-up Information     Follow up With Specialties Details Why Contact Mally Correia MD [address] [address]  [phone]            There are no discharge medications for this patient  No discharge procedures on file  PDMP Review       Value Time User    PDMP Reviewed  Yes 12/19/2021  8:08 AM Linda Sarmiento PA-C           ED Provider  Attending physically available and evaluated Arelis Narda   I managed the patient along with the ED Attending      Electronically Signed by         Nicholas Armando DO  01/12/23 6342

## 2023-01-12 NOTE — DISCHARGE INSTRUCTIONS
You have been seen for anxiety and alcohol intoxication  Your work-up here did not show any acute findings  Your symptoms are likely related to anxiety and/or alcohol intoxication  You should return to the ED if you develop worsening chest pain, trouble breathing, syncope, worsening abdominal pain, or other worsening symptoms  Follow up with your primary care physician

## 2023-01-12 NOTE — ED ATTENDING ATTESTATION
1/12/2023  IJo Ann MD, saw and evaluated the patient  I have discussed the patient with the resident/non-physician practitioner and agree with the resident's/non-physician practitioner's findings, Plan of Care, and MDM as documented in the resident's/non-physician practitioner's note, except where noted  All available labs and Radiology studies were reviewed  I was present for key portions of any procedure(s) performed by the resident/non-physician practitioner and I was immediately available to provide assistance  At this point I agree with the current assessment done in the Emergency Department  I have conducted an independent evaluation of this patient a history and physical is as follows:      Final Diagnosis:  1  Anxiety    2  Alcohol intoxication ([address] )    3  Atypical chest pain    4  Nausea & vomiting      Chief Complaint   Patient presents with   • Anxiety     Patient states couldn't control breathing and felt like he was going to pass out  States has been under a lot of stress lately, not being able to work and marital stress  States has not taken meds  29-year-old male who presents with shortness of breath and anxiety  He donated plasma earlier today  Has been drinking throughout the day today  Does have a history of depression/anxiety, but does not take his medications  He "could not control his breathing" and started to feel lightheaded  EMS was called  He has been under a lot of stress lately  PMH:  -Depression/anxiety  PSH:  -Not applicable    PE:   Vitals:    01/12/23 0034 01/12/23 0118   BP: 109/81    BP Location: Right arm    Pulse: 96    Resp: (!) 28 20   Temp:  98 5 °F (36 9 °C)   TempSrc:  Oral   SpO2: 97%            Constitutional: He appears well-developed  He is cooperative  HENT:   Mouth/Throat: Uvula is midline, oropharynx is clear and moist and mucous membranes are normal    Eyes: Pupils are equal, round, and reactive to light   Conjunctivae and EOM are normal    Neck: Trachea normal  No thyroid mass and no thyromegaly present  Cardiovascular: Normal rate, regular rhythm, normal heart sounds  No murmur heard  Pulmonary/Chest: Tachypneic, but saturating well on room air and speaking in full sentences  Normal breath sounds  Abdominal: Soft  Normal appearance and bowel sounds are normal  There is no tenderness  There is no rebound, no guarding  Neurological: He is alert  Skin: Skin is warm, dry and intact  Psychiatric: Tearful and anxious  A:  -This is a 73-year-old male with a history of anxiety who presents with shortness of breath and anxiety  P:  -Likely anxiety attack  However, patient is complaining of shortness of breath  Evaluate for ACS with EKG and troponin  Evaluate for infection/anemia with CBC  Chest x-ray to rule out pneumothorax or pneumonia as cause of shortness of breath  We will treat patient for anxiety  - 13 point ROS was performed and all are normal unless stated in the history above  - Nursing note reviewed  Vitals reviewed  - Orders placed by myself and/or advanced practitioner / resident     - Previous chart was reviewed  - No language barrier    - History obtained from patient  - There are no limitations to the history obtained  - Critical care time: Not applicable for this patient            Medications   sodium chloride 0 9 % bolus 1,000 mL (1,000 mL Intravenous New Bag 1/12/23 0110)   acetaminophen (TYLENOL) tablet 650 mg (650 mg Oral Given 1/12/23 0113)   LORazepam (ATIVAN) injection 1 mg (1 mg Intravenous Given 1/12/23 0115)   haloperidol lactate (HALDOL) injection 5 mg (5 mg Intravenous Given 1/12/23 0115)     XR chest 1 view portable   ED Interpretation   No acute cardiopulmonary process        Orders Placed This Encounter   Procedures   • XR chest 1 view portable   • CBC and differential   • Comprehensive metabolic panel   • Lipase   • HS Troponin 0hr (reflex protocol)   • Continuous cardiac monitoring   • Insert peripheral IV   • ECG 12 lead   • ECG 12 lead     Labs Reviewed   CBC AND DIFFERENTIAL - Abnormal       Result Value Ref Range Status    WBC 9 83  4 31 - 10 16 Thousand/uL Final    RBC 4 81  3 88 - 5 62 Million/uL Final    Hemoglobin 14 7  12 0 - 17 0 g/dL Final    Hematocrit 42 5  36 5 - 49 3 % Final    MCV 88  82 - 98 fL Final    MCH 30 6  26 8 - 34 3 pg Final    MCHC 34 6  31 4 - 37 4 g/dL Final    RDW 11 2 (*) 11 6 - 15 1 % Final    MPV 9 3  8 9 - 12 7 fL Final    Platelets 539  788 - 390 Thousands/uL Final    nRBC 0  /100 WBCs Final    Neutrophils Relative 51  43 - 75 % Final    Immat GRANS % 1  0 - 2 % Final    Lymphocytes Relative 39  14 - 44 % Final    Monocytes Relative 7  4 - 12 % Final    Eosinophils Relative 1  0 - 6 % Final    Basophils Relative 1  0 - 1 % Final    Neutrophils Absolute 5 20  1 85 - 7 62 Thousands/µL Final    Immature Grans Absolute 0 05  0 00 - 0 20 Thousand/uL Final    Lymphocytes Absolute 3 80  0 60 - 4 47 Thousands/µL Final    Monocytes Absolute 0 64  0 17 - 1 22 Thousand/µL Final    Eosinophils Absolute 0 09  0 00 - 0 61 Thousand/µL Final    Basophils Absolute 0 05  0 00 - 0 10 Thousands/µL Final   COMPREHENSIVE METABOLIC PANEL - Abnormal    Sodium 138  135 - 147 mmol/L Final    Potassium 3 7  3 5 - 5 3 mmol/L Final    Chloride 105  96 - 108 mmol/L Final    CO2 26  21 - 32 mmol/L Final    ANION GAP 7  4 - 13 mmol/L Final    BUN 10  5 - 25 mg/dL Final    Creatinine 1 07  0 60 - 1 30 mg/dL Final    Comment: Standardized to IDMS reference method    Glucose 130  65 - 140 mg/dL Final    Comment: If the patient is fasting, the ADA then defines impaired fasting glucose as > 100 mg/dL and diabetes as > or equal to 123 mg/dL  Specimen collection should occur prior to Sulfasalazine administration due to the potential for falsely depressed results  Specimen collection should occur prior to Sulfapyridine administration due to the potential for falsely elevated results  Calcium 8 5  8 3 - 10 1 mg/dL Final    AST 24  5 - 45 U/L Final    Comment: Specimen collection should occur prior to Sulfasalazine administration due to the potential for falsely depressed results  ALT 49  12 - 78 U/L Final    Comment: Specimen collection should occur prior to Sulfasalazine administration due to the potential for falsely depressed results  Alkaline Phosphatase 91  46 - 116 U/L Final    Total Protein 7 3  6 4 - 8 4 g/dL Final    Albumin 3 5  3 5 - 5 0 g/dL Final    Total Bilirubin 0 18 (*) 0 20 - 1 00 mg/dL Final    Comment: Use of this assay is not recommended for patients undergoing treatment with eltrombopag due to the potential for falsely elevated results  eGFR 88  ml/min/1 73sq m Final    Narrative:     [place] guidelines for Chronic Kidney Disease (CKD):   •  Stage 1 with normal or high GFR (GFR > 90 mL/min/1 73 square meters)  •  Stage 2 Mild CKD (GFR = 60-89 mL/min/1 73 square meters)  •  Stage 3A Moderate CKD (GFR = 45-59 mL/min/1 73 square meters)  •  Stage 3B Moderate CKD (GFR = 30-44 mL/min/1 73 square meters)  •  Stage 4 Severe CKD (GFR = 15-29 mL/min/1 73 square meters)  •  Stage 5 End Stage CKD (GFR <15 mL/min/1 73 square meters)  Note: GFR calculation is accurate only with a steady state creatinine   LIPASE - Normal    Lipase 182  73 - 393 u/L Final   HS TROPONIN I 0HR - Normal    hs TnI 0hr 2  "Refer to ACS Flowchart"- see link ng/L Final    Comment:                                              Initial (time 0) result  If >=50 ng/L, Myocardial injury suggested ;  Type of myocardial injury and treatment strategy  to be determined  If 5-49 ng/L, a delta result at 2 hours and or 4 hours will be needed to further evaluate  If <4 ng/L, and chest pain has been >3 hours since onset, patient may qualify for discharge based on the HEART score in the ED    If <5 ng/L and <3hours since onset of chest pain, a delta result at 2 hours will be needed to further evaluate  HS Troponin 99th Percentile URL of a Health Population=12 ng/L with a 95% Confidence Interval of 8-18 ng/L  Second Troponin (time 2 hours)  If calculated delta >= 20 ng/L,  Myocardial injury suggested ; Type of myocardial injury and treatment strategy to be determined  If 5-49 ng/L and the calculated delta is 5-19 ng/L, consult medical service for evaluation  Continue evaluation for ischemia on ecg and other possible etiology and repeat hs troponin at 4 hours  If delta is <5 ng/L at 2 hours, consider discharge based on risk stratification via the HEART score (if in ED), or DALE risk score in IP/Observation  HS Troponin 99th Percentile URL of a Health Population=12 ng/L with a 95% Confidence Interval of 8-18 ng/L  Time reflects when diagnosis was documented in both MDM as applicable and the Disposition within this note     Time User Action Codes Description Comment    1/12/2023  2:23 AM Hung Jews Add [F41 9] Anxiety     1/12/2023  2:23 AM Madeline Zuniga Add [F10 929] Alcohol intoxication ([address] )     1/12/2023  2:23 AM Hung Jews Add [R07 89] Atypical chest pain     1/12/2023  2:23 AM Hung Jews Add [R11 2] Nausea & vomiting       ED Disposition     ED Disposition   Discharge    Condition   Good    Date/Time   Thu Jan 12, 2023  2:23 AM    Comment   Alfie Livingston discharge to home/self care  Follow-up Information     Follow up With Specialties Details Why [address], [place] [address]  [place]   [address]  [id number] [phone]          Patient's Medications    No medications on file     No discharge procedures on file  None       Portions of the record may have been created with voice recognition software  Occasional wrong word or "sound a like" substitutions may have occurred due to the inherent limitations of voice recognition software   Read the chart carefully and recognize, using context, where substitutions have occurred        ED Course         Critical Care Time  Procedures

## 2024-04-24 ENCOUNTER — APPOINTMENT (EMERGENCY)
Dept: CT IMAGING | Facility: HOSPITAL | Age: 39
End: 2024-04-24
Payer: COMMERCIAL

## 2024-04-24 ENCOUNTER — HOSPITAL ENCOUNTER (EMERGENCY)
Facility: HOSPITAL | Age: 39
Discharge: HOME | End: 2024-04-24
Attending: EMERGENCY MEDICINE
Payer: COMMERCIAL

## 2024-04-24 VITALS
SYSTOLIC BLOOD PRESSURE: 102 MMHG | TEMPERATURE: 99 F | DIASTOLIC BLOOD PRESSURE: 75 MMHG | RESPIRATION RATE: 18 BRPM | HEART RATE: 92 BPM | WEIGHT: 241.84 LBS | OXYGEN SATURATION: 98 % | BODY MASS INDEX: 36.24 KG/M2
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
## 2024-04-24 LAB — GLUCOSE SERPL-MCNC: 139 MG/DL (ref 65–140)

## 2024-04-24 PROCEDURE — 99284 EMERGENCY DEPT VISIT MOD MDM: CPT

## 2024-04-24 PROCEDURE — 70450 CT HEAD/BRAIN W/O DYE: CPT

## 2024-04-24 PROCEDURE — 82948 REAGENT STRIP/BLOOD GLUCOSE: CPT

## 2024-04-24 PROCEDURE — 99285 EMERGENCY DEPT VISIT HI MDM: CPT | Performed by: PHYSICIAN ASSISTANT

## 2024-04-24 PROCEDURE — 96374 THER/PROPH/DIAG INJ IV PUSH: CPT

## 2024-04-24 PROCEDURE — 71260 CT THORAX DX C+: CPT

## 2024-04-24 PROCEDURE — 70486 CT MAXILLOFACIAL W/O DYE: CPT

## 2024-04-24 PROCEDURE — 74177 CT ABD & PELVIS W/CONTRAST: CPT

## 2024-04-24 RX ORDER — ACETAMINOPHEN 325 MG/1
975 TABLET ORAL ONCE
Status: COMPLETED | OUTPATIENT
Start: 2024-04-24 | End: 2024-04-24

## 2024-04-24 RX ORDER — KETOROLAC TROMETHAMINE 30 MG/ML
15 INJECTION, SOLUTION INTRAMUSCULAR; INTRAVENOUS ONCE
Status: COMPLETED | OUTPATIENT
Start: 2024-04-24 | End: 2024-04-24

## 2024-04-24 RX ADMIN — KETOROLAC TROMETHAMINE 15 MG: 30 INJECTION, SOLUTION INTRAMUSCULAR; INTRAVENOUS at 02:40

## 2024-04-24 RX ADMIN — ACETAMINOPHEN 975 MG: 325 TABLET ORAL at 08:11

## 2024-04-24 RX ADMIN — IOHEXOL 100 ML: 350 INJECTION, SOLUTION INTRAVENOUS at 03:26
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
## 2024-04-24 NOTE — ED NOTES
"Spoke to Magno (wife) per pt request   She is aware that he is here and she states she is not going to be able to come for him because she is going to work.  Asked if there was anyone else we could call and she said \"No, I doubt anyone is coming for him.\"  Pt is aware of the same.     Dayanara Morrissey RN  04/24/24 0215    "
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
